# Patient Record
Sex: FEMALE | Race: BLACK OR AFRICAN AMERICAN | URBAN - METROPOLITAN AREA
[De-identification: names, ages, dates, MRNs, and addresses within clinical notes are randomized per-mention and may not be internally consistent; named-entity substitution may affect disease eponyms.]

---

## 2024-02-20 ENCOUNTER — OFFICE VISIT (OUTPATIENT)
Dept: OBGYN CLINIC | Age: 24
End: 2024-02-20

## 2024-02-20 VITALS
HEIGHT: 63 IN | BODY MASS INDEX: 38.62 KG/M2 | DIASTOLIC BLOOD PRESSURE: 78 MMHG | SYSTOLIC BLOOD PRESSURE: 124 MMHG | WEIGHT: 218 LBS

## 2024-02-20 DIAGNOSIS — Z11.3 SCREEN FOR STD (SEXUALLY TRANSMITTED DISEASE): ICD-10-CM

## 2024-02-20 DIAGNOSIS — Z12.4 PAP SMEAR FOR CERVICAL CANCER SCREENING: ICD-10-CM

## 2024-02-20 DIAGNOSIS — N97.0 ANOVULATORY CYCLE: ICD-10-CM

## 2024-02-20 DIAGNOSIS — E28.2 PCOS (POLYCYSTIC OVARIAN SYNDROME): ICD-10-CM

## 2024-02-20 DIAGNOSIS — Z01.419 WELL WOMAN EXAM WITH ROUTINE GYNECOLOGICAL EXAM: Primary | ICD-10-CM

## 2024-02-20 LAB
EST. AVERAGE GLUCOSE BLD GHB EST-MCNC: 105 MG/DL
HBA1C MFR BLD: 5.3 % (ref 4.8–5.6)
HBV SURFACE AG SER QL: NONREACTIVE
HCV AB SER QL: NONREACTIVE
HIV 1+2 AB+HIV1 P24 AG SERPL QL IA: NONREACTIVE
HIV 1/2 RESULT COMMENT: NORMAL
PROLACTIN SERPL-MCNC: 4.9 NG/ML

## 2024-02-20 PROCEDURE — 99385 PREV VISIT NEW AGE 18-39: CPT | Performed by: OBSTETRICS & GYNECOLOGY

## 2024-02-20 PROCEDURE — 99459 PELVIC EXAMINATION: CPT | Performed by: OBSTETRICS & GYNECOLOGY

## 2024-02-20 SDOH — ECONOMIC STABILITY: INCOME INSECURITY: HOW HARD IS IT FOR YOU TO PAY FOR THE VERY BASICS LIKE FOOD, HOUSING, MEDICAL CARE, AND HEATING?: SOMEWHAT HARD

## 2024-02-20 SDOH — ECONOMIC STABILITY: HOUSING INSECURITY
IN THE LAST 12 MONTHS, WAS THERE A TIME WHEN YOU DID NOT HAVE A STEADY PLACE TO SLEEP OR SLEPT IN A SHELTER (INCLUDING NOW)?: PATIENT DECLINED

## 2024-02-20 SDOH — ECONOMIC STABILITY: TRANSPORTATION INSECURITY
IN THE PAST 12 MONTHS, HAS LACK OF TRANSPORTATION KEPT YOU FROM MEETINGS, WORK, OR FROM GETTING THINGS NEEDED FOR DAILY LIVING?: NO

## 2024-02-20 SDOH — ECONOMIC STABILITY: FOOD INSECURITY: WITHIN THE PAST 12 MONTHS, YOU WORRIED THAT YOUR FOOD WOULD RUN OUT BEFORE YOU GOT MONEY TO BUY MORE.: NEVER TRUE

## 2024-02-20 SDOH — ECONOMIC STABILITY: FOOD INSECURITY: WITHIN THE PAST 12 MONTHS, THE FOOD YOU BOUGHT JUST DIDN'T LAST AND YOU DIDN'T HAVE MONEY TO GET MORE.: NEVER TRUE

## 2024-02-20 NOTE — PROGRESS NOTES
CC:  Annual GYN exam    HPI:  23 y.o.  No obstetric history on file. presents today for a routine gynecological examination.  Patient's last menstrual period was 12/15/2023..    Pt additionally wants to discuss c/o irregular bleeding. No workup for this prior besides ER visits. Not on anything. + dark hair on chin and abdomen. + acne.     PCP: no    Contraception:  None      Menses:  Irregular. Will skip cycle sometimes       Sexually active w/ male partner   Desires full STD testing     + Smoker. 1 PPD        Ob hx:  Go        GYN HISTORY:  As per HPI     Last Pap:  Patient is unsure   Hx of Abnl Paps: Patient is unsure     Hx STDs: no     OB History          0    Para   0    Term   0       0    AB   0    Living   0         SAB   0    IAB   0    Ectopic   0    Molar   0    Multiple   0    Live Births   0                  No past medical history on file.      No past surgical history on file.      No outpatient encounter medications on file as of 2024.     No facility-administered encounter medications on file as of 2024.         Allergies   Allergen Reactions    Penicillins Rash         Family History   Problem Relation Age of Onset    Breast Cancer Neg Hx     Colon Cancer Neg Hx     Ovarian Cancer Neg Hx          Social History     Socioeconomic History    Marital status: Unknown   Tobacco Use    Smoking status: Every Day     Current packs/day: 1.00     Average packs/day: 1 pack/day for 5.0 years (5.0 ttl pk-yrs)     Types: Cigarettes    Smokeless tobacco: Never   Vaping Use    Vaping Use: Never used   Substance and Sexual Activity    Alcohol use: Not Currently    Drug use: Never    Sexual activity: Yes     Partners: Male           ROS:  Constitutional: Negative for chills, fever and weight loss.   HENT: Negative for hearing loss.   Eyes: Negative for blurred vision and double vision.   Respiratory: Negative for cough, hemoptysis and shortness of breath.   Cardiovascular: Negative for chest

## 2024-02-21 LAB
CHOLEST SERPL-MCNC: 147 MG/DL
HDLC SERPL-MCNC: 74 MG/DL (ref 40–60)
HDLC SERPL: 2
LDLC SERPL CALC-MCNC: 60.8 MG/DL
RPR SER QL: NONREACTIVE
TRIGL SERPL-MCNC: 61 MG/DL (ref 35–150)
TSH W FREE THYROID IF ABNORMAL: 1.06 UIU/ML (ref 0.36–3.74)
VLDLC SERPL CALC-MCNC: 12.2 MG/DL (ref 6–23)

## 2024-02-22 LAB
17OHP SERPL-MCNC: 63 NG/DL
DHEA-S SERPL-MCNC: 244 UG/DL (ref 84.8–378)
TESTOST SERPL-MCNC: 41 NG/DL (ref 13–71)

## 2024-02-27 LAB
TESTOST FREE SERPL-MCNC: 1.9 PG/ML (ref 0–4.2)
TESTOST SERPL-MCNC: 41 NG/DL (ref 13–71)

## 2024-02-28 LAB
C TRACH RRNA CVX QL NAA+PROBE: NEGATIVE
COLLECTION METHOD: NORMAL
CYTOLOGIST CVX/VAG CYTO: NORMAL
CYTOLOGY CVX/VAG DOC THIN PREP: NORMAL
DATE OF LMP: NORMAL
HPV APTIMA: NEGATIVE
HPV GENOTYPE REFLEX: NORMAL
Lab: NORMAL
N GONORRHOEA RRNA CVX QL NAA+PROBE: NEGATIVE
OTHER PT INFO: NORMAL
PAP SOURCE: NORMAL
PATH REPORT.FINAL DX SPEC: NORMAL
PATHOLOGIST CVX/VAG CYTO: NORMAL
PREV CYTO INFO: NEGATIVE
PREV TREATMENT RESULTS: NORMAL
PREV TREATMENT: NORMAL
STAT OF ADQ CVX/VAG CYTO-IMP: NORMAL
T VAGINALIS RRNA SPEC QL NAA+PROBE: NEGATIVE

## 2024-03-05 ENCOUNTER — OFFICE VISIT (OUTPATIENT)
Dept: OBGYN CLINIC | Age: 24
End: 2024-03-05
Payer: COMMERCIAL

## 2024-03-05 VITALS
SYSTOLIC BLOOD PRESSURE: 124 MMHG | WEIGHT: 218 LBS | BODY MASS INDEX: 38.62 KG/M2 | HEIGHT: 63 IN | DIASTOLIC BLOOD PRESSURE: 78 MMHG

## 2024-03-05 DIAGNOSIS — D27.9 DERMOID CYST OF OVARY, UNSPECIFIED LATERALITY: ICD-10-CM

## 2024-03-05 DIAGNOSIS — E28.2 PCOS (POLYCYSTIC OVARIAN SYNDROME): Primary | ICD-10-CM

## 2024-03-05 PROCEDURE — 99213 OFFICE O/P EST LOW 20 MIN: CPT | Performed by: OBSTETRICS & GYNECOLOGY

## 2024-03-05 NOTE — PROGRESS NOTES
Result visit:       Serge Fredis Patricia is her today to discuss labs results which was performed for irregular cycles .     + smoker    Patient also wanted to discuss hx of dermoid cyst that she forgot about. Seen 1-2 years ago on US. No FU since     Vitals:    03/05/24 1023   BP: 124/78      PCOS labs reviewed and negative   Pap and STD testing negative     A/P:   PCOS:   Lifestyle modifications discussed   Would not start with estrogen containing method as she is a smoker. Options discussed will try Micronor. May need to add spironolactone, but will bring back in 3 mo for sx check after she tries some lifestyle changes as well  History of dermoid cyst: FU US scheduled             Juana Hernandez DO

## 2024-03-06 RX ORDER — ACETAMINOPHEN AND CODEINE PHOSPHATE 120; 12 MG/5ML; MG/5ML
1 SOLUTION ORAL DAILY
Qty: 28 TABLET | Refills: 12 | Status: SHIPPED | OUTPATIENT
Start: 2024-03-06

## 2024-04-12 ENCOUNTER — OFFICE VISIT (OUTPATIENT)
Dept: FAMILY MEDICINE CLINIC | Facility: CLINIC | Age: 24
End: 2024-04-12
Payer: COMMERCIAL

## 2024-04-12 VITALS
OXYGEN SATURATION: 98 % | WEIGHT: 223.44 LBS | BODY MASS INDEX: 39.59 KG/M2 | HEART RATE: 93 BPM | SYSTOLIC BLOOD PRESSURE: 110 MMHG | TEMPERATURE: 98.1 F | HEIGHT: 63 IN | DIASTOLIC BLOOD PRESSURE: 72 MMHG

## 2024-04-12 DIAGNOSIS — Z23 NEED FOR PROPHYLACTIC VACCINATION AGAINST STREPTOCOCCUS PNEUMONIAE (PNEUMOCOCCUS): ICD-10-CM

## 2024-04-12 DIAGNOSIS — H60.392 OTHER INFECTIVE ACUTE OTITIS EXTERNA OF LEFT EAR: Primary | ICD-10-CM

## 2024-04-12 PROBLEM — R79.89 ELEVATED TESTOSTERONE LEVEL IN FEMALE: Status: ACTIVE | Noted: 2022-01-27

## 2024-04-12 PROCEDURE — 90677 PCV20 VACCINE IM: CPT

## 2024-04-12 PROCEDURE — 99203 OFFICE O/P NEW LOW 30 MIN: CPT

## 2024-04-12 PROCEDURE — 90471 IMMUNIZATION ADMIN: CPT

## 2024-04-12 RX ORDER — OFLOXACIN 3 MG/ML
5 SOLUTION AURICULAR (OTIC) 2 TIMES DAILY
Qty: 2.5 ML | Refills: 0 | Status: SHIPPED | OUTPATIENT
Start: 2024-04-12 | End: 2024-04-17

## 2024-04-12 SDOH — HEALTH STABILITY: PHYSICAL HEALTH: ON AVERAGE, HOW MANY DAYS PER WEEK DO YOU ENGAGE IN MODERATE TO STRENUOUS EXERCISE (LIKE A BRISK WALK)?: 3 DAYS

## 2024-04-12 SDOH — HEALTH STABILITY: PHYSICAL HEALTH: ON AVERAGE, HOW MANY MINUTES DO YOU ENGAGE IN EXERCISE AT THIS LEVEL?: 30 MIN

## 2024-04-12 ASSESSMENT — ENCOUNTER SYMPTOMS
NAUSEA: 0
DIARRHEA: 0
CHEST TIGHTNESS: 0
BLOOD IN STOOL: 0
CONSTIPATION: 0
ABDOMINAL PAIN: 0
SHORTNESS OF BREATH: 0
VOMITING: 0
COUGH: 0

## 2024-04-12 ASSESSMENT — PATIENT HEALTH QUESTIONNAIRE - PHQ9
SUM OF ALL RESPONSES TO PHQ QUESTIONS 1-9: 2
1. LITTLE INTEREST OR PLEASURE IN DOING THINGS: SEVERAL DAYS
SUM OF ALL RESPONSES TO PHQ QUESTIONS 1-9: 2
SUM OF ALL RESPONSES TO PHQ9 QUESTIONS 1 & 2: 2
SUM OF ALL RESPONSES TO PHQ QUESTIONS 1-9: 2
2. FEELING DOWN, DEPRESSED OR HOPELESS: SEVERAL DAYS
SUM OF ALL RESPONSES TO PHQ QUESTIONS 1-9: 2

## 2024-04-12 NOTE — PROGRESS NOTES
NEW PATIENT ESTABLISH PRIMARY CARE PROVIDER VISIT     Raziakim Fredis Patricia is a 23 y.o. female new patient who presents to establish PCP care     PMH   has no past medical history on file.      Past Surgical History:   Procedure Laterality Date    TOOTH EXTRACTION          ALLERGIES    Allergies   Allergen Reactions    Penicillins Rash         HOSPITALIZATIONS    None    PREVIOUS PRIMARY CARE PROVIDER    N/A      SPECIALISTS    [facility]: had endorsed irregular bleeding with dark hair on abdomen/chin, acne at her last visit on 2/20/24; completed pap smear and STD testing which was negative; hormonal levels unremarkable    Went for recheck on 3/5: discussed PCOS, started on Micronor, discussed possibly starting spironolactone.     HPI:  HPI    Ms. Patricia is a pleasant 24 y/o female who is here to establish care. We discussed her PMH. She is currently seeing GYN for OCP mgt, doing well.     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 She relays she quit this job d/t the back pain. Relays it has improved since quitting. Denies pain radiation to extremities, sensational changes, weakness.     She also endorses left ear fullness and itching. She has been using leroy pins to clean her ears. She denies ear drainage or pain. Upon exam, no cerumen noted however left ear canal is erythematous. Will treat for otitis externa. Advised to not place anything into ears, they will \"self clean\" and it is normal to have wax. Also advised to try an antihistamine for two weeks as she does endorse seasonal allergies.     Review of Systems:    Review of Systems   Constitutional:  Negative for activity change, appetite change, chills, fatigue, fever and unexpected weight change.   HENT:          Left ear fullness and itching.   Respiratory:  Negative for cough, chest tightness and shortness of breath.    Cardiovascular:  Negative for